# Patient Record
Sex: FEMALE | Race: WHITE | HISPANIC OR LATINO | URBAN - METROPOLITAN AREA
[De-identification: names, ages, dates, MRNs, and addresses within clinical notes are randomized per-mention and may not be internally consistent; named-entity substitution may affect disease eponyms.]

---

## 2021-05-04 ENCOUNTER — OUTPATIENT (OUTPATIENT)
Dept: OUTPATIENT SERVICES | Facility: HOSPITAL | Age: 41
LOS: 1 days | End: 2021-05-04
Payer: MEDICAID

## 2021-05-04 ENCOUNTER — RESULT REVIEW (OUTPATIENT)
Age: 41
End: 2021-05-04

## 2021-05-04 ENCOUNTER — APPOINTMENT (OUTPATIENT)
Dept: OBGYN | Facility: HOSPITAL | Age: 41
End: 2021-05-04
Payer: MEDICAID

## 2021-05-04 VITALS
TEMPERATURE: 97.9 F | HEIGHT: 66 IN | BODY MASS INDEX: 39.86 KG/M2 | SYSTOLIC BLOOD PRESSURE: 117 MMHG | WEIGHT: 248 LBS | DIASTOLIC BLOOD PRESSURE: 42 MMHG | HEART RATE: 102 BPM

## 2021-05-04 DIAGNOSIS — D50.9 IRON DEFICIENCY ANEMIA, UNSPECIFIED: ICD-10-CM

## 2021-05-04 DIAGNOSIS — Z87.19 PERSONAL HISTORY OF OTHER DISEASES OF THE DIGESTIVE SYSTEM: ICD-10-CM

## 2021-05-04 DIAGNOSIS — F84.0 AUTISTIC DISORDER: ICD-10-CM

## 2021-05-04 DIAGNOSIS — Z00.00 ENCOUNTER FOR GENERAL ADULT MEDICAL EXAMINATION W/OUT ABNORMAL FINDINGS: ICD-10-CM

## 2021-05-04 DIAGNOSIS — D64.9 ANEMIA, UNSPECIFIED: ICD-10-CM

## 2021-05-04 DIAGNOSIS — N92.0 EXCESSIVE AND FREQUENT MENSTRUATION WITH REGULAR CYCLE: ICD-10-CM

## 2021-05-04 DIAGNOSIS — F90.9 ATTENTION-DEFICIT HYPERACTIVITY DISORDER, UNSPECIFIED TYPE: ICD-10-CM

## 2021-05-04 DIAGNOSIS — Q79.60 EHLERS-DANLOS SYNDROME, UNSP: ICD-10-CM

## 2021-05-04 LAB
BASOPHILS # BLD AUTO: 0.11 K/UL — SIGNIFICANT CHANGE UP (ref 0–0.2)
BASOPHILS NFR BLD AUTO: 1.3 % — SIGNIFICANT CHANGE UP (ref 0–2)
EOSINOPHIL # BLD AUTO: 0.1 K/UL — SIGNIFICANT CHANGE UP (ref 0–0.5)
EOSINOPHIL NFR BLD AUTO: 1.2 % — SIGNIFICANT CHANGE UP (ref 0–6)
HCG UR QL: NEGATIVE
HCT VFR BLD CALC: 34.3 % — LOW (ref 34.5–45)
HGB BLD-MCNC: 10.1 G/DL — LOW (ref 11.5–15.5)
IANC: 5.27 K/UL — SIGNIFICANT CHANGE UP (ref 1.5–8.5)
IMM GRANULOCYTES NFR BLD AUTO: 0.3 % — SIGNIFICANT CHANGE UP (ref 0–1.5)
LYMPHOCYTES # BLD AUTO: 2.52 K/UL — SIGNIFICANT CHANGE UP (ref 1–3.3)
LYMPHOCYTES # BLD AUTO: 29.1 % — SIGNIFICANT CHANGE UP (ref 13–44)
MCHC RBC-ENTMCNC: 22.2 PG — LOW (ref 27–34)
MCHC RBC-ENTMCNC: 29.4 GM/DL — LOW (ref 32–36)
MCV RBC AUTO: 75.4 FL — LOW (ref 80–100)
MONOCYTES # BLD AUTO: 0.64 K/UL — SIGNIFICANT CHANGE UP (ref 0–0.9)
MONOCYTES NFR BLD AUTO: 7.4 % — SIGNIFICANT CHANGE UP (ref 2–14)
NEUTROPHILS # BLD AUTO: 5.27 K/UL — SIGNIFICANT CHANGE UP (ref 1.8–7.4)
NEUTROPHILS NFR BLD AUTO: 60.7 % — SIGNIFICANT CHANGE UP (ref 43–77)
NRBC # BLD: 0 /100 WBCS — SIGNIFICANT CHANGE UP
NRBC # FLD: 0 K/UL — SIGNIFICANT CHANGE UP
PLATELET # BLD AUTO: 404 K/UL — HIGH (ref 150–400)
QUALITY CONTROL: YES
RBC # BLD: 4.55 M/UL — SIGNIFICANT CHANGE UP (ref 3.8–5.2)
RBC # FLD: 18.9 % — HIGH (ref 10.3–14.5)
T4 FREE SERPL-MCNC: 1.4 NG/DL — SIGNIFICANT CHANGE UP (ref 0.9–1.8)
TSH SERPL-MCNC: 2.47 UIU/ML — SIGNIFICANT CHANGE UP (ref 0.27–4.2)
WBC # BLD: 8.67 K/UL — SIGNIFICANT CHANGE UP (ref 3.8–10.5)
WBC # FLD AUTO: 8.67 K/UL — SIGNIFICANT CHANGE UP (ref 3.8–10.5)

## 2021-05-04 PROCEDURE — 88305 TISSUE EXAM BY PATHOLOGIST: CPT | Mod: 26

## 2021-05-04 PROCEDURE — 58100 BIOPSY OF UTERUS LINING: CPT | Mod: GC

## 2021-05-04 PROCEDURE — 99203 OFFICE O/P NEW LOW 30 MIN: CPT | Mod: GC,25

## 2021-05-04 RX ORDER — QUETIAPINE 400 MG/1
TABLET, FILM COATED ORAL
Refills: 0 | Status: ACTIVE | COMMUNITY

## 2021-05-04 RX ORDER — CLONAZEPAM 2 MG/1
TABLET ORAL
Refills: 0 | Status: ACTIVE | COMMUNITY

## 2021-05-04 RX ORDER — FAMOTIDINE 40 MG/1
TABLET, FILM COATED ORAL
Refills: 0 | Status: ACTIVE | COMMUNITY

## 2021-05-04 RX ORDER — DEXTROAMPHETAMINE SACCHARATE, AMPHETAMINE ASPARTATE, DEXTROAMPHETAMINE SULFATE, AND AMPHETAMINE SULFATE 3.75; 3.75; 3.75; 3.75 MG/1; MG/1; MG/1; MG/1
TABLET ORAL
Refills: 0 | Status: ACTIVE | COMMUNITY

## 2021-05-05 DIAGNOSIS — F84.0 AUTISTIC DISORDER: ICD-10-CM

## 2021-05-05 DIAGNOSIS — D50.9 IRON DEFICIENCY ANEMIA, UNSPECIFIED: ICD-10-CM

## 2021-05-05 DIAGNOSIS — Q79.60 EHLERS-DANLOS SYNDROME, UNSPECIFIED: ICD-10-CM

## 2021-05-05 DIAGNOSIS — Z00.00 ENCOUNTER FOR GENERAL ADULT MEDICAL EXAMINATION WITHOUT ABNORMAL FINDINGS: ICD-10-CM

## 2021-05-05 DIAGNOSIS — Z87.19 PERSONAL HISTORY OF OTHER DISEASES OF THE DIGESTIVE SYSTEM: ICD-10-CM

## 2021-05-05 DIAGNOSIS — D64.9 ANEMIA, UNSPECIFIED: ICD-10-CM

## 2021-05-05 DIAGNOSIS — N92.0 EXCESSIVE AND FREQUENT MENSTRUATION WITH REGULAR CYCLE: ICD-10-CM

## 2021-05-05 DIAGNOSIS — F90.9 ATTENTION-DEFICIT HYPERACTIVITY DISORDER, UNSPECIFIED TYPE: ICD-10-CM

## 2021-05-06 LAB — SURGICAL PATHOLOGY STUDY: SIGNIFICANT CHANGE UP

## 2021-05-17 ENCOUNTER — APPOINTMENT (OUTPATIENT)
Dept: ULTRASOUND IMAGING | Facility: CLINIC | Age: 41
End: 2021-05-17
Payer: MEDICAID

## 2021-05-17 ENCOUNTER — TRANSCRIPTION ENCOUNTER (OUTPATIENT)
Age: 41
End: 2021-05-17

## 2021-05-17 ENCOUNTER — OUTPATIENT (OUTPATIENT)
Dept: OUTPATIENT SERVICES | Facility: HOSPITAL | Age: 41
LOS: 1 days | End: 2021-05-17

## 2021-05-17 PROCEDURE — 76830 TRANSVAGINAL US NON-OB: CPT | Mod: 26

## 2021-05-17 PROCEDURE — 76856 US EXAM PELVIC COMPLETE: CPT | Mod: 26

## 2021-05-24 ENCOUNTER — NON-APPOINTMENT (OUTPATIENT)
Age: 41
End: 2021-05-24

## 2021-06-01 ENCOUNTER — OUTPATIENT (OUTPATIENT)
Dept: OUTPATIENT SERVICES | Facility: HOSPITAL | Age: 41
LOS: 1 days | End: 2021-06-01

## 2021-06-01 ENCOUNTER — APPOINTMENT (OUTPATIENT)
Dept: OBGYN | Facility: HOSPITAL | Age: 41
End: 2021-06-01
Payer: MEDICAID

## 2021-06-01 VITALS
TEMPERATURE: 98.1 F | SYSTOLIC BLOOD PRESSURE: 127 MMHG | WEIGHT: 245 LBS | HEIGHT: 66 IN | DIASTOLIC BLOOD PRESSURE: 80 MMHG | HEART RATE: 100 BPM | BODY MASS INDEX: 39.37 KG/M2

## 2021-06-01 DIAGNOSIS — R32 UNSPECIFIED URINARY INCONTINENCE: ICD-10-CM

## 2021-06-01 DIAGNOSIS — Z30.430 ENCOUNTER FOR INSERTION OF INTRAUTERINE CONTRACEPTIVE DEVICE: ICD-10-CM

## 2021-06-01 PROCEDURE — 99213 OFFICE O/P EST LOW 20 MIN: CPT | Mod: 25,GC

## 2021-06-01 PROCEDURE — 58300 INSERT INTRAUTERINE DEVICE: CPT | Mod: GC

## 2021-06-01 RX ORDER — IBUPROFEN 200 MG/1
200 TABLET, FILM COATED ORAL
Qty: 0 | Refills: 0 | Status: COMPLETED | OUTPATIENT
Start: 2021-06-01

## 2021-06-01 RX ADMIN — IBUPROFEN 0 MG: 200 TABLET, FILM COATED ORAL at 00:00

## 2021-06-01 NOTE — PHYSICAL EXAM
[Appropriately responsive] : appropriately responsive [Alert] : alert [No Acute Distress] : no acute distress [Oriented x3] : oriented x3 [Normal] : normal

## 2021-06-02 NOTE — PROCEDURE
[IUD Placement] : intrauterine device (IUD) placement [Time out performed] : Pre-procedure time out performed.  Patient's name, date of birth and procedure confirmed. [Consent Obtained] : Consent obtained [Abnormal Uterine Bleeding] : abnormal uterine bleeding [Risks] : risks [Benefits] : benefits [Alternatives] : alternatives [Patient] : patient [Infection] : infection [Bleeding] : bleeding [Pain] : pain [Expulsion] : expulsion [Failure] : failure [Uterine Perforation] : uterine perforation [Neg Pregnancy Test] : negative pregnancy test [Ibuprofen ___ mg] : ibuprofen [unfilled] ~Umg [Betadine] : Betadine [Tenaculum] : Tenaculum [Easy Passage] : Easy passage [Sounded to ___ cm] : sounded to [unfilled] ~Ucm [Mirena IUD] : Mirena IUD [Tolerated Well] : Patient tolerated the procedure well [No Complications] : No complications [LMPDate] : 5/16/21 [de-identified] : [id number] [de-identified] : 2/2023 [de-identified] : 6/1/2028

## 2021-06-02 NOTE — REVIEW OF SYSTEMS
[Incontinence] : incontinence [Pelvic pain] : pelvic pain [Negative] : Heme/Lymph [Dysuria] : no dysuria

## 2021-06-02 NOTE — REASON FOR VISIT
[Follow-Up] : a follow-up evaluation of [Dysmenorrhea] : dysmenorrhea [Menorrhagia] : menorrhagia [FreeTextEntry2] : menorrhagia

## 2021-06-02 NOTE — DISCUSSION/SUMMARY
[FreeTextEntry1] : Patient is a 41yo  presenting for evaluation of urinary incontinence, dyspareunia and menorrhagia. Patient counseled extensively on options for menorrhagia and opts for Mirena IUD placement at this time, with possibility for endometrial ablation at a later date should IUD fail to improve menorrhagia. \par \par Plan: \par #Urinary incontinence\par - Patient currently undergoing workup with urologist in [place] (unsure of name)\par - Urogynecology referral given today per patient request\par \par #Deep dyspareunia in context of EDS\par - Referral given to [facility] pelvic floor PT\par \par #Menorrhagia\par - TVUS: 6mm endometrium, no fibroids or polyps visualized\par - EMB: hypersecretory endometrium c/w exogenous hormonal effect (pt using Nuvaring at the time)\par - Mirena IUD placed today; abdominal US confirms placement in mid-fundus\par - Pt to go for TVUS in one week to f/u position \par - Pt counseled extensively to expect spotting and irregular bleeding for several months following insertion. Will f/u PRN. \par \par YISEL Westfall, PGY1\par Pt seen and evaluated with Dr. Lundberg

## 2021-06-02 NOTE — END OF VISIT
[Resident] : Resident [FreeTextEntry2] : patient seen and examined at bedside with resident. Agree with above assessment and plan

## 2021-06-02 NOTE — HISTORY OF PRESENT ILLNESS
[FreeTextEntry1] : Patient is a 41yo  (LMP 5) presenting for f/u multiple complaints including menorrhagia, urinary incontinence and pelvic pain. Patient states that she continues to have "dribbling" incontinence as mentioned last time. She is currently seeing a urologist in [place] and being worked up for this, however she is interested in getting a referral to urogynecology as well. 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 Patient is primarily here to f/u menorrhagia. Since last visit, EMB showed hypersecretory glands and TVUS showed 6mm lining with no fibroids or polyps. At last visit she was counseled on Mirena vs. endometrial ablation. Patient states that she is having heavy bleeding "every other month". \par \par OBHx: TOPx1 s/p D&C\par GYNHx: denies h/o fibroids, cysts, STDs. H/o HPV+ pap \par PMHx: Seda-Danlos syndrome, autism \par PSHx: s/p gastric sleeve and hiatal hernia repair () \par Meds: seroquel 200mg QD\par All: NKDA\par Social: quit smoking (1 pack per week) 1 week ago. Denies alcohol or drug use\par FHx: h/o HTN and DM on mother's side

## 2021-06-03 DIAGNOSIS — N92.0 EXCESSIVE AND FREQUENT MENSTRUATION WITH REGULAR CYCLE: ICD-10-CM

## 2021-06-09 NOTE — PHYSICAL EXAM
[Appropriately responsive] : appropriately responsive [Alert] : alert [No Acute Distress] : no acute distress [Soft] : soft [Non-distended] : non-distended [Oriented x3] : oriented x3 [Labia Majora] : normal [Labia Minora] : normal [No Bleeding] : There was no active vaginal bleeding [Normal] : normal [Uterine Adnexae] : normal [FreeTextEntry7] : +mild tenderness in b/l pelvis

## 2021-06-09 NOTE — PROCEDURE
[Endometrial Biopsy] : Endometrial biopsy [Risks] : risks [Benefits] : benefits [Alternatives] : alternatives [Patient] : patient [Infection] : infection [Bleeding] : bleeding [Uterine Perforation] : uterine perforation [Pain] : pain [Negative] : negative pregnancy test [No Premedication] : No premedication [Betadine] : Betadine [None] : none [Tenaculum] : Tenaculum [Easy Passage] : Easy passage [Sounded to ___ cm] : sounded to [unfilled] ~Ucm [Mid Position] : mid position [Scant] : scant [Sent to Pathology] : placed in buffered formalin and sent for pathology [Tolerated Well] : Patient tolerated the procedure well [No Complications] : No complications [de-identified] : HMB [LMPDate] : 4/12/21

## 2021-06-09 NOTE — HISTORY OF PRESENT ILLNESS
[FreeTextEntry1] : 40y  LMP , h/o AUB and iron-deficiency anemia, presents with a primary complaint of worsening heavy menstrual bleeding x1 year. Patient reports she has always had heavy periods (since 18 y/o) but in the last year the periods last consistently 12-14 days, are associated with clots, cramps, and are associated with symptoms of anemia. The patient uses a margareth cup and frequently has overflow. The patient reports frequently experiencing presyncopal symptoms (lightheadedness, "stars" in her vision) and  reports an episode of syncope 1 week ago for which she went to the ED at [facility]. Her Hgb was >9 at that time and she states she was not transfused. She states she has always had anemia but her hgb was not previously as low as 9.  When she used to live in [place] 1-2 years ago, she would receive IV iron infusions regularly for the anemia. Menses otherwise occur on a monthly basis. Denies EMBx in the past.\par \par She also c/o 5 months of vaginal dryness and dyspareunia. She is sexually active with the same partner for >1 year. They regularly get screened for STIs. She is currently using the nuva ring for birth control. She tried Depo and OCPs in the past and reports extreme changes in mood with their use. Denies ever being offered a Levonorgestrel IUD. \par \par She also endorses UTI x2 since this year started and urinary incontinence. The incontinence is described as a slow drip throughout the day, associated with frequency. Denies stress incontinence, urgency and dysuria. \par \par Last pap less than a year ago, she will bring a copy of the result at the next visit.\par \par OBH - TOP x 1 (d&C)\par GYNH - 2x abnormal paps over the past two years, HPV neg; denies fibroids/cysts/STIs \par PMH - Seda-Danlos, autism, ADHD, anemia, recurrent hiatal hernia, obesity  \par PSH - s/p repair of a hiatal hernia, gastric sleeve, tonsillectomy \par Meds - quetiapine, pepcid, iron, Adderall, klonopin prn \par All - opioids (intolerance: ill, nausea)\par FamH - denies breast/ovarian/uterine/endometrial cancer, denies colon cancer\par

## 2021-06-09 NOTE — REVIEW OF SYSTEMS
[Frequency] : frequency [Incontinence] : incontinence [Abn Vaginal bleeding] : abnormal vaginal bleeding [Pelvic pain] : pelvic pain [Negative] : Genitourinary [Urgency] : no urgency [Dysuria] : no dysuria [Urethral Discharge] : no urethral discharge